# Patient Record
Sex: MALE | Race: WHITE | NOT HISPANIC OR LATINO | Employment: PART TIME | URBAN - NONMETROPOLITAN AREA
[De-identification: names, ages, dates, MRNs, and addresses within clinical notes are randomized per-mention and may not be internally consistent; named-entity substitution may affect disease eponyms.]

---
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
## 2025-03-20 ENCOUNTER — OFFICE VISIT (OUTPATIENT)
Dept: DERMATOLOGY | Facility: CLINIC | Age: 20
End: 2025-03-20
Payer: COMMERCIAL

## 2025-03-20 DIAGNOSIS — L70.0 ACNE VULGARIS: Primary | ICD-10-CM

## 2025-03-20 LAB
ALBUMIN SERPL BCG-MCNC: 4.6 G/DL (ref 3.5–5.2)
ALP SERPL-CCNC: 85 U/L (ref 40–150)
ALT SERPL W P-5'-P-CCNC: 44 U/L (ref 0–70)
ANION GAP SERPL CALCULATED.3IONS-SCNC: 10 MMOL/L (ref 7–15)
AST SERPL W P-5'-P-CCNC: 33 U/L (ref 0–45)
BILIRUB SERPL-MCNC: 0.4 MG/DL
BUN SERPL-MCNC: 11.1 MG/DL (ref 6–20)
CALCIUM SERPL-MCNC: 9.9 MG/DL (ref 8.8–10.4)
CHLORIDE SERPL-SCNC: 103 MMOL/L (ref 98–107)
CHOLEST SERPL-MCNC: 182 MG/DL
CREAT SERPL-MCNC: 1.18 MG/DL (ref 0.67–1.17)
EGFRCR SERPLBLD CKD-EPI 2021: >90 ML/MIN/1.73M2
ERYTHROCYTE [DISTWIDTH] IN BLOOD BY AUTOMATED COUNT: 12.9 % (ref 10–15)
FASTING STATUS PATIENT QL REPORTED: NO
FASTING STATUS PATIENT QL REPORTED: NO
GLUCOSE SERPL-MCNC: 68 MG/DL (ref 70–99)
HCO3 SERPL-SCNC: 29 MMOL/L (ref 22–29)
HCT VFR BLD AUTO: 44.7 % (ref 40–53)
HDLC SERPL-MCNC: 61 MG/DL
HGB BLD-MCNC: 15.3 G/DL (ref 13.3–17.7)
LDLC SERPL CALC-MCNC: 99 MG/DL
MCH RBC QN AUTO: 30.5 PG (ref 26.5–33)
MCHC RBC AUTO-ENTMCNC: 34.2 G/DL (ref 31.5–36.5)
MCV RBC AUTO: 89 FL (ref 78–100)
NONHDLC SERPL-MCNC: 121 MG/DL
PLATELET # BLD AUTO: 283 10E3/UL (ref 150–450)
POTASSIUM SERPL-SCNC: 4.2 MMOL/L (ref 3.4–5.3)
PROT SERPL-MCNC: 7.2 G/DL (ref 6.4–8.3)
RBC # BLD AUTO: 5.01 10E6/UL (ref 4.4–5.9)
SODIUM SERPL-SCNC: 142 MMOL/L (ref 135–145)
TRIGL SERPL-MCNC: 112 MG/DL
WBC # BLD AUTO: 6.5 10E3/UL (ref 4–11)

## 2025-03-20 NOTE — PROGRESS NOTES
Win Davidson is a pleasant 19 year old year old male patient here today for acne vulgaris. He notes acne has been present since he was 12 years old. He reports was on doxycycline and adapalene in the past with minimal results in 2022. He currently is using acne otc scrub, with mild results. He does have a history of anxiety and depression but controlled with his medication. Patient has no other skin complaints today.  Remainder of the HPI, Meds, PMH, Allergies, FH, and SH was reviewed in chart.    Pertinent Hx:   Acne vulgaris   History reviewed. No pertinent past medical history.    History reviewed. No pertinent surgical history.     Family History   Problem Relation Age of Onset    Other - See Comments Mother         Psychiatric illness,Bipolar    Hyperthyroidism Mother        Social History     Socioeconomic History    Marital status: Single     Spouse name: Not on file    Number of children: Not on file    Years of education: Not on file    Highest education level: Not on file   Occupational History    Not on file   Tobacco Use    Smoking status: Passive Smoke Exposure - Never Smoker    Smokeless tobacco: Never   Vaping Use    Vaping status: Never Used    Passive vaping exposure: Yes   Substance and Sexual Activity    Alcohol use: Never    Drug use: Never    Sexual activity: Never   Other Topics Concern    Not on file   Social History Narrative    37-week 5 lb. 6 oz. Twin A, large twin and twin/twin transfusion, 37 weeks gestation.       2 half-siblings and twin brother.  Dad is gone for long periods of time due to his job.   Parents  2016, mom getting an appartment and looking for work in [employer].     Rosalind Prince  Mother  Amilcar Davidson  Father     Social Drivers of Health     Financial Resource Strain: Not on file   Food Insecurity: Not on file   Transportation Needs: Not on file   Physical Activity: Not on file   Stress: Not on file   Social Connections: Not on file   Interpersonal Safety: Not  on file   Housing Stability: Not on file       Outpatient Encounter Medications as of 3/20/2025   Medication Sig Dispense Refill    adapalene (DIFFERIN) 0.1 % external gel Apply topically At Bedtime 45 g 11    doxycycline hyclate (VIBRAMYCIN) 100 MG capsule Take 1 capsule (100 mg) by mouth daily 30 capsule 1    FLUoxetine (PROZAC) 40 MG capsule Take 1 capsule (40 mg) by mouth daily 30 capsule 1    vitamin D3 (CHOLECALCIFEROL) 50 mcg (2000 units) tablet Take 1 tablet (50 mcg) by mouth daily 100 tablet 3     No facility-administered encounter medications on file as of 3/20/2025.             O:   NAD, WDWN, Alert & Oriented, Mood & Affect wnl, Vitals stable   Here today alone   There were no vitals taken for this visit.   General appearance normal   Vitals stable   Alert, oriented and in no acute distress      1+ inflammatory papules on face, chest and back      Eyes: Conjunctivae/lids:Normal     ENT: Lips normal    MSK:Normal    Pulm: Breathing Normal    Neuro/Psych: Orientation:Alert and Orientedx3 ; Mood/Affect:normal     A/P:  1. Acne Vulgaris   Discussed with history of anxiety and depression, we request note from psychiatrist stating that mood is stable and they are ok with us starting isotretinoin. Fax number was provided for patient to have his psychiatrist fax a letter.   Standing CBC, CMP and fasting lipids  Ipledge reviewed with patient and Ipledge consent form complete  Patient place in ipledge system  iPLEDGE REMS ID: [id number]  Goal Dosage:   Return to clinic 30 days  Dry lips and mouth, minor swelling of the eyelids or lips, crusty skin, nosebleeds, GI upset, or thinning of hair may occur. If any of these effects persist or worsen, tell your doctor or pharmacist promptly.   To relieve dry mouth, suck on (sugarless) hard candy or ice chips, chew (sugarless) gum, drink water.   Remember that your doctor has prescribed this medication because he or she has judged that the benefit to you is greater than the  risk of side effects. Many people using this medication do not have serious side effects.   Contact office immediately if you have any of these unlikely but serious side effects: mental/mood changes (e.g., depression,  aggressive or violent behavior, and in rare cases, thoughts of suicide), tingling feeling in the skin, quick/severe sun sensitivity, back/joint/muscle pain, signs of infection (e.g., fever, persistent sore throat, painful swallowing, peeling skin on palms/soles.   Isotretinoin may infrequently cause disease of the pancreatitis, that may rarely be fatal. Stop taking this medication and contact office immediately if you develop: severe stomach pain severe or persistent GI upset,   Stop taking this medication and tell your doctor immediately if you develop these unlikely but very serious side effects: severe headache, vision changes, ear ringing, hearling loss, chest pain, yellowing eyes, skin, dark urine, severe diarrhea, rectal bleeding,   Seek immediate medical attention if you notice any symptoms of a serious allergic reaction.    Accutane is discussed fully with the patient. It is a very effective drug to treat acne vulgaris but has many potential significant side effects. Chief among these are teratogensis, hepatic injury, dyslipidemia and severe drying of the mucous membranes. All of these issues have been discussed in details. Monthly blood tests to monitor lipids and liver functions will be necessary. Expect painful dryness and/or fissuring around the lips, eyes, and other moist areas of the body. Balms may be protective. Contact lens may be too painful to wear temporarily while on this drug. Episodes of significant depression have been reported, including suicidal ideation and attempts in rare cases. It may also cause pseudotumor cerebri and hyperostosis. The patient will report any such changes in mood, depressive symptoms or suicidal thoughts, headaches, joint or bone pains. There is also a  possible association with inflammatory bowel disease, although this is unproven at this point.

## 2025-03-20 NOTE — PATIENT INSTRUCTIONS
Accutane/Isotretinoin: Card with fax and phone number given. We will need confirmation from your psychiatrist that it is ok to start Accutane.    Return to clinic 30 days. *Inability to follow through with future appointments as scheduled may result in Accutane prescription not being available to you as this is a government regulated medication*     The first 3 follow up appointments should be in office.     Following office visits can be scheduled as virtual.    Please complete ordered lab testing prior to or the day of your virtual visit to streamline your appointment and medication . This can be done by scheduling a lab only appointment at your preferred [facility].    Please send photo documentation of your acne areas (even if improved) in a COMS Interactive message to the provider the day before or the day of your virtual visit. It can be hard to clearly visualize the current state of your skin in a virtual visit.      For female patients: Female patients MUST use two simultaneous methods of family planning/contraception. Accutane is Category X for pregnancy, meaning it will cause fetal teratogenic malformations, and pregnancy MUST be avoided while on this drug. For that reason, the patient is instructed to never share the medication. Female patients will also need to log onto Ipledge each month to answer needed questions before the pharmacy will allow the  Accutane order to be filled and picked up.    Dry lips and mouth, dry eyes, minor swelling of the eyelids or lips, crusty skin, nosebleeds, GI upset, or thinning of hair may occur. If any of these effects persist or worsen, tell your doctor or pharmacist promptly.     To relieve dry mouth, suck on (sugarless) hard candy or ice chips, chew (sugarless) gum, drink water.     Remember that your doctor has prescribed this medication because he or she has judged that the benefit to you is greater than the risk of side effects.   Many people using this  medication do not have serious side effects.     Contact office immediately if you have any of these unlikely but serious side effects: mental/mood changes (e.g., depression,  aggressive or violent behavior, and in rare cases, thoughts of suicide), tingling feeling in the skin, quick/severe sun sensitivity, back/joint/muscle pain, signs of infection (e.g., fever, persistent) sore throat, painful swallowing, peeling skin on palms/soles.     Isotretinoin may infrequently cause disease of the pancreatitis, that may rarely be fatal.   Stop taking this medication and contact office immediately if you develop: severe stomach pain severe or persistent GI upset.    Stop taking this medication and tell your doctor immediately if you develop these unlikely but very serious side effects: severe headache, vision changes, ear ringing, hearling loss, chest pain, yellowing eyes, skin, dark urine, severe diarrhea, rectal bleeding.    Seek immediate medical attention if you notice any symptoms of a serious allergic reaction.           Proper skin care from [facility]:    -Eliminate harsh soaps as they strip the natural oils from the skin, often resulting in dry itchy skin ( i.e. Dial, Zest, [place])  -Use mild soaps such as Cetaphil or Dove Sensitive Skin in the shower. You do not need to use soap on arms, legs, and trunk every time you shower unless visibly soiled.   -Avoid hot or cold showers.  -After showering, lightly dry off and apply moisturizing within 2-3 minutes. This will help trap moisture in the skin.   -Aggressive use of a moisturizer at least 1-2 times a day to the entire body (including -Vanicream, Cetaphil, Aquaphor or Cerave) and moisturize hands after every washing.  -We recommend using moisturizers that come in a tub that needs to be scooped out, not a pump. This has more of an oil base. It will hold moisture in your skin much better than a water base moisturizer. The above recommended are non-pore  clogging.      Wear a sunscreen with at least SPF 30 on your face, ears, neck and V of the chest daily. Wear sunscreen on other areas of the body if those areas are exposed to the sun throughout the day. Sunscreens can contain physical and/or chemical blockers. Physical blockers are less likely to clog pores, these include zinc oxide and titanium dioxide. Reapply every two hour and after swimming.     Sunscreen examples: https://www.ewg.org/sunscreen/    UV radiation  UVA radiation remains constant throughout the day and throughout the year. It is a longer wavelength than UVB and therefore penetrates deeper into the skin leading to immediate and delayed tanning, photoaging, and skin cancer. 70-80% of UVA and UVB radiation occurs between the hours of 10am-2pm.  UVB radiation  UVB radiation causes the most harmful effects and is more significant during the summer months. However, snow and ice can reflect UVB radiation leading to skin damage during the winter months as well. UVB radiation is responsible for tanning, burning, inflammation, delayed erythema (pinkness), pigmentation (brown spots), and skin cancer.     I recommend self monthly full body exams and yearly full body exams with a dermatology provider. If you develop a new or changing lesion please follow up for examination. Most skin cancers are pink and scaly or pink and pearly. However, we do see blue/brown/black skin cancers.  Consider the ABCDEs of melanoma when giving yourself your monthly full body exam ( don't forget the groin, buttocks, feet, toes, etc). A-asymmetry, B-borders, C-color, D-diameter, E-elevation or evolving. If you see any of these changes please follow up in clinic. If you cannot see your back I recommend purchasing a hand held mirror to use with a larger wall mirror.       Checking for Skin Cancer  You can find cancer early by checking your skin each month. There are 3 kinds of skin cancer. They are melanoma, basal cell carcinoma, and  squamous cell carcinoma. Doing monthly skin checks is the best way to find new marks or skin changes. Follow the instructions below for checking your skin.   The ABCDEs of checking moles for melanoma   Check your moles or growths for signs of melanoma using ABCDE:   Asymmetry: the sides of the mole or growth don t match  Border: the edges are ragged, notched, or blurred  Color: the color within the mole or growth varies  Diameter: the mole or growth is larger than 6 mm (size of a pencil eraser)  Evolving: the size, shape, or color of the mole or growth is changing (evolving is not shown in the images below)    Checking for other types of skin cancer  Basal cell carcinoma or squamous cell carcinoma have symptoms such as:     A spot or mole that looks different from all other marks on your skin  Changes in how an area feels, such as itching, tenderness, or pain  Changes in the skin's surface, such as oozing, bleeding, or scaliness  A sore that does not heal  New swelling or redness beyond the border of a mole    Who s at risk?  Anyone can get skin cancer. But you are at greater risk if you have:   Fair skin, light-colored hair, or light-colored eyes  Many moles or abnormal moles on your skin  A history of sunburns from sunlight or tanning beds  A family history of skin cancer  A history of exposure to radiation or chemicals  A weakened immune system  If you have had skin cancer in the past, you are at risk for recurring skin cancer.   How to check your skin  Do your monthly skin checkups in front of a full-length mirror. Check all parts of your body, including your:   Head (ears, face, neck, and scalp)  Torso (front, back, and sides)  Arms (tops, undersides, upper, and lower armpits)  Hands (palms, backs, and fingers, including under the nails)  Buttocks and genitals  Legs (front, back, and sides)  Feet (tops, soles, toes, including under the nails, and between toes)  If you have a lot of moles, take digital photos of  them each month. Make sure to take photos both up close and from a distance. These can help you see if any moles change over time.   Most skin changes are not cancer. But if you see any changes in your skin, call your doctor right away. Only he or she can diagnose a problem. If you have skin cancer, seeing your doctor can be the first step toward getting the treatment that could save your life.   [facility] last reviewed this educational content on 4/1/2019 2000-2020 [employer], [address] [place] [address], [place] [address]. All rights reserved. This information is not intended as a substitute for professional medical care. Always follow your healthcare professional's instructions.       When should I call my doctor?  If you are worsening or not improving, please, contact us or seek urgent care as noted below.     Who should I call with questions (adults)?    [facility] [phone]  For urgent needs outside of business hours call the [facility] at [phone] and ask for the dermatology resident on call to be paged  If this is a medical emergency and you are unable to reach an ER, Call [phone]      If you need a prescription refill, please contact your pharmacy. Refills are approved or denied by our Physicians during normal business hours, Monday through Friday.  Per office policy, refills will not be granted if you have not been seen within the past year (or sooner depending on the condition).

## 2025-03-20 NOTE — LETTER
3/20/2025      Win Davidson  [address]  [facility] [address]      Dear Colleague,    Thank you for referring your patient, Win Davidson, to the [facility]. Please see a copy of my visit note below.    Win Davidson is a pleasant 19 year old year old male patient here today for acne vulgaris. He notes acne has been present since he was 12 years old. He reports was on doxycycline and adapalene in the past with minimal results in 2022. He currently is using acne otc scrub, with mild results. He does have a history of anxiety and depression but controlled with his medication. Patient has no other skin complaints today.  Remainder of the HPI, Meds, PMH, Allergies, FH, and SH was reviewed in chart.    Pertinent Hx:   Acne vulgaris   History reviewed. No pertinent past medical history.    History reviewed. No pertinent surgical history.     Family History   Problem Relation Age of Onset     Other - See Comments Mother         Psychiatric illness,Bipolar     Hyperthyroidism Mother        Social History     Socioeconomic History     Marital status: Single     Spouse name: Not on file     Number of children: Not on file     Years of education: Not on file     Highest education level: Not on file   Occupational History     Not on file   Tobacco Use     Smoking status: Passive Smoke Exposure - Never Smoker     Smokeless tobacco: Never   Vaping Use     Vaping status: Never Used     Passive vaping exposure: Yes   Substance and Sexual Activity     Alcohol use: Never     Drug use: Never     Sexual activity: Never   Other Topics Concern     Not on file   Social History Narrative    37-week 5 lb. 6 oz. Twin A, large twin and twin/twin transfusion, 37 weeks gestation.       2 half-siblings and twin brother.  Dad is gone for long periods of time due to his job.   Parents  2016, mom getting an appartment and looking for work in [employer]: turn off the lights.     Rosalind Prince  Mother  Amilcar Davidson  Father      Social Drivers of Health     Financial Resource Strain: Not on file   Food Insecurity: Not on file   Transportation Needs: Not on file   Physical Activity: Not on file   Stress: Not on file   Social Connections: Not on file   Interpersonal Safety: Not on file   Housing Stability: Not on file       Outpatient Encounter Medications as of 3/20/2025   Medication Sig Dispense Refill     adapalene (DIFFERIN) 0.1 % external gel Apply topically At Bedtime 45 g 11     doxycycline hyclate (VIBRAMYCIN) 100 MG capsule Take 1 capsule (100 mg) by mouth daily 30 capsule 1     FLUoxetine (PROZAC) 40 MG capsule Take 1 capsule (40 mg) by mouth daily 30 capsule 1     vitamin D3 (CHOLECALCIFEROL) 50 mcg (2000 units) tablet Take 1 tablet (50 mcg) by mouth daily 100 tablet 3     No facility-administered encounter medications on file as of 3/20/2025.             O:   NAD, WDWN, Alert & Oriented, Mood & Affect wnl, Vitals stable   Here today alone   There were no vitals taken for this visit.   General appearance normal   Vitals stable   Alert, oriented and in no acute distress      1+ inflammatory papules on face, chest and back      Eyes: Conjunctivae/lids:Normal     ENT: Lips normal    MSK:Normal    Pulm: Breathing Normal    Neuro/Psych: Orientation:Alert and Orientedx3 ; Mood/Affect:normal     A/P:  1. Acne Vulgaris   Discussed with history of anxiety and depression, we request note from psychiatrist stating that mood is stable and they are ok with us starting isotretinoin. Fax number was provided for patient to have his psychiatrist fax a letter.   Standing CBC, CMP and fasting lipids  Ipledge reviewed with patient and Ipledge consent form complete  Patient place in ipledge system  iPLEDGE REMS ID: [id number]  Goal Dosage:   Return to clinic 30 days  Dry lips and mouth, minor swelling of the eyelids or lips, crusty skin, nosebleeds, GI upset, or thinning of hair may occur. If any of these effects persist or worsen, tell your doctor  or pharmacist promptly.   To relieve dry mouth, suck on (sugarless) hard candy or ice chips, chew (sugarless) gum, drink water.   Remember that your doctor has prescribed this medication because he or she has judged that the benefit to you is greater than the risk of side effects. Many people using this medication do not have serious side effects.   Contact office immediately if you have any of these unlikely but serious side effects: mental/mood changes (e.g., depression,  aggressive or violent behavior, and in rare cases, thoughts of suicide), tingling feeling in the skin, quick/severe sun sensitivity, back/joint/muscle pain, signs of infection (e.g., fever, persistent sore throat, painful swallowing, peeling skin on palms/soles.   Isotretinoin may infrequently cause disease of the pancreatitis, that may rarely be fatal. Stop taking this medication and contact office immediately if you develop: severe stomach pain severe or persistent GI upset,   Stop taking this medication and tell your doctor immediately if you develop these unlikely but very serious side effects: severe headache, vision changes, ear ringing, hearling loss, chest pain, yellowing eyes, skin, dark urine, severe diarrhea, rectal bleeding,   Seek immediate medical attention if you notice any symptoms of a serious allergic reaction.    Accutane is discussed fully with the patient. It is a very effective drug to treat acne vulgaris but has many potential significant side effects. Chief among these are teratogensis, hepatic injury, dyslipidemia and severe drying of the mucous membranes. All of these issues have been discussed in details. Monthly blood tests to monitor lipids and liver functions will be necessary. Expect painful dryness and/or fissuring around the lips, eyes, and other moist areas of the body. Balms may be protective. Contact lens may be too painful to wear temporarily while on this drug. Episodes of significant depression have been  reported, including suicidal ideation and attempts in rare cases. It may also cause pseudotumor cerebri and hyperostosis. The patient will report any such changes in mood, depressive symptoms or suicidal thoughts, headaches, joint or bone pains. There is also a possible association with inflammatory bowel disease, although this is unproven at this point.       Again, thank you for allowing me to participate in the care of your patient.        Sincerely,        Bibiana Fischer PA-C    Electronically signed

## 2025-03-21 ENCOUNTER — TRANSFERRED RECORDS (OUTPATIENT)
Dept: HEALTH INFORMATION MANAGEMENT | Facility: CLINIC | Age: 20
End: 2025-03-21
Payer: COMMERCIAL

## 2025-03-21 ENCOUNTER — TELEPHONE (OUTPATIENT)
Dept: DERMATOLOGY | Facility: CLINIC | Age: 20
End: 2025-03-21
Payer: COMMERCIAL

## 2025-03-21 NOTE — TELEPHONE ENCOUNTER
Called direct line for Olena- this is [facility]    Called [facility]- pressed psych extension- left voice mail that we need a letter from her psych provider stating that patient is clear to take Accutane- we need a letter faxed- not a verbal from a nurse.        A/P:  1. Acne Vulgaris   Discussed with history of anxiety and depression, we request note from psychiatrist stating that mood is stable and they are ok with us starting isotretinoin. Fax number was provided for patient to have his psychiatrist fax a letter.     [place] fax-  F: [phone]     Thank you,    Savannah LALRN BSN  [facility]- [phone]

## 2025-03-21 NOTE — TELEPHONE ENCOUNTER
Patient notified of results, his current weight is 140 pounds (63.6 Kg)  Patient spoke w/Psych yesterday and they will be getting back to us    Yudith Bell RN

## 2025-03-21 NOTE — TELEPHONE ENCOUNTER
Olena called back- when the  tried to transfer her- she was not responding to us- could not hear us. We disconnected the call.    Thank you,    Savannah LALRN BSN  [facility]- [phone]

## 2025-03-21 NOTE — TELEPHONE ENCOUNTER
[facility] Call Center    Phone Message    May a detailed message be left on voicemail: yes     Reason for Call: Olena from [facility] calling to discuss Rx for Win. [phone] main number. Please call Olena on direct line: [phone]     Action Taken: Other: [facility]    Travel Screening: Not Applicable     Date of Service:

## 2025-03-21 NOTE — TELEPHONE ENCOUNTER
----- Message from Bibiana Fischer sent at 3/20/2025  8:42 PM CDT -----  Darryl Walden,  Your blood count, cholesterol are normal. Creatinine was slightly elevated, which we will monitor. As soon as I get note from his psychiatrist I will send in medication. Please also get current weight.

## 2025-03-26 ENCOUNTER — TELEPHONE (OUTPATIENT)
Dept: DERMATOLOGY | Facility: CLINIC | Age: 20
End: 2025-03-26
Payer: COMMERCIAL

## 2025-03-26 NOTE — TELEPHONE ENCOUNTER
Patient Contact    Attempt # 1    Was call answered?  No, [facility]   [phone]

## 2025-03-26 NOTE — TELEPHONE ENCOUNTER
Received: Yesterday  Bibiana Deleon PA-C sent to [facility]  Please let patient know first month of isotretinoin sent, received note from his psychiatrist. Please have him sign up for mychart.

## 2025-04-17 ENCOUNTER — OFFICE VISIT (OUTPATIENT)
Dept: DERMATOLOGY | Facility: CLINIC | Age: 20
End: 2025-04-17
Payer: COMMERCIAL

## 2025-04-17 DIAGNOSIS — L70.0 ACNE VULGARIS: ICD-10-CM

## 2025-04-17 DIAGNOSIS — L30.8 RETINOID DERMATITIS: Primary | ICD-10-CM

## 2025-04-17 LAB
ALBUMIN SERPL BCG-MCNC: 4.5 G/DL (ref 3.5–5.2)
ALP SERPL-CCNC: 85 U/L (ref 40–150)
ALT SERPL W P-5'-P-CCNC: 40 U/L (ref 0–70)
ANION GAP SERPL CALCULATED.3IONS-SCNC: 12 MMOL/L (ref 7–15)
AST SERPL W P-5'-P-CCNC: 32 U/L (ref 0–45)
BILIRUB SERPL-MCNC: 0.5 MG/DL
BUN SERPL-MCNC: 11.5 MG/DL (ref 6–20)
CALCIUM SERPL-MCNC: 9.9 MG/DL (ref 8.8–10.4)
CHLORIDE SERPL-SCNC: 99 MMOL/L (ref 98–107)
CHOLEST SERPL-MCNC: 164 MG/DL
CREAT SERPL-MCNC: 1.15 MG/DL (ref 0.67–1.17)
EGFRCR SERPLBLD CKD-EPI 2021: >90 ML/MIN/1.73M2
ERYTHROCYTE [DISTWIDTH] IN BLOOD BY AUTOMATED COUNT: 12.5 % (ref 10–15)
FASTING STATUS PATIENT QL REPORTED: NO
FASTING STATUS PATIENT QL REPORTED: NO
GLUCOSE SERPL-MCNC: 72 MG/DL (ref 70–99)
HCO3 SERPL-SCNC: 28 MMOL/L (ref 22–29)
HCT VFR BLD AUTO: 43.7 % (ref 40–53)
HDLC SERPL-MCNC: 48 MG/DL
HGB BLD-MCNC: 15.1 G/DL (ref 13.3–17.7)
LDLC SERPL CALC-MCNC: 90 MG/DL
MCH RBC QN AUTO: 30.5 PG (ref 26.5–33)
MCHC RBC AUTO-ENTMCNC: 34.6 G/DL (ref 31.5–36.5)
MCV RBC AUTO: 88 FL (ref 78–100)
NONHDLC SERPL-MCNC: 116 MG/DL
PLATELET # BLD AUTO: 266 10E3/UL (ref 150–450)
POTASSIUM SERPL-SCNC: 4 MMOL/L (ref 3.4–5.3)
PROT SERPL-MCNC: 7.3 G/DL (ref 6.4–8.3)
RBC # BLD AUTO: 4.95 10E6/UL (ref 4.4–5.9)
SODIUM SERPL-SCNC: 139 MMOL/L (ref 135–145)
TRIGL SERPL-MCNC: 132 MG/DL
WBC # BLD AUTO: 8.5 10E3/UL (ref 4–11)

## 2025-04-17 RX ORDER — ISOTRETINOIN 40 MG/1
40 CAPSULE ORAL
Qty: 30 CAPSULE | Refills: 0 | Status: SHIPPED | OUTPATIENT
Start: 2025-04-17

## 2025-04-17 RX ORDER — TRIAMCINOLONE ACETONIDE 0.25 MG/G
OINTMENT TOPICAL
Qty: 15 G | Refills: 1 | Status: SHIPPED | OUTPATIENT
Start: 2025-04-17

## 2025-04-17 RX ORDER — PREDNISONE 10 MG/1
TABLET ORAL
Qty: 14 TABLET | Refills: 0 | Status: SHIPPED | OUTPATIENT
Start: 2025-04-17

## 2025-04-17 NOTE — PATIENT INSTRUCTIONS
Proper skin care from [facility]:    -Eliminate harsh soaps as they strip the natural oils from the skin, often resulting in dry itchy skin ( i.e. Dial, Zest, [place] Spring)  -Use mild soaps such as Cetaphil or Dove Sensitive Skin in the shower. You do not need to use soap on arms, legs, and trunk every time you shower unless visibly soiled.   -Avoid hot or cold showers.  -After showering, lightly dry off and apply moisturizing within 2-3 minutes. This will help trap moisture in the skin.   -Aggressive use of a moisturizer at least 1-2 times a day to the entire body (including -Vanicream, Cetaphil, Aquaphor or Cerave) and moisturize hands after every washing.  -We recommend using moisturizers that come in a tub that needs to be scooped out, not a pump. This has more of an oil base. It will hold moisture in your skin much better than a water base moisturizer. The above recommended are non-pore clogging.      Wear a sunscreen with at least SPF 30 on your face, ears, neck and V of the chest daily. Wear sunscreen on other areas of the body if those areas are exposed to the sun throughout the day. Sunscreens can contain physical and/or chemical blockers. Physical blockers are less likely to clog pores, these include zinc oxide and titanium dioxide. Reapply every two hour and after swimming.     Sunscreen examples: https://www.ewg.org/sunscreen/    UV radiation  UVA radiation remains constant throughout the day and throughout the year. It is a longer wavelength than UVB and therefore penetrates deeper into the skin leading to immediate and delayed tanning, photoaging, and skin cancer. 70-80% of UVA and UVB radiation occurs between the hours of 10am-2pm.  UVB radiation  UVB radiation causes the most harmful effects and is more significant during the summer months. However, snow and ice can reflect UVB radiation leading to skin damage during the winter months as well. UVB radiation is responsible for tanning,  burning, inflammation, delayed erythema (pinkness), pigmentation (brown spots), and skin cancer.     I recommend self monthly full body exams and yearly full body exams with a dermatology provider. If you develop a new or changing lesion please follow up for examination. Most skin cancers are pink and scaly or pink and pearly. However, we do see blue/brown/black skin cancers.  Consider the ABCDEs of melanoma when giving yourself your monthly full body exam ( don't forget the groin, buttocks, feet, toes, etc). A-asymmetry, B-borders, C-color, D-diameter, E-elevation or evolving. If you see any of these changes please follow up in clinic. If you cannot see your back I recommend purchasing a hand held mirror to use with a larger wall mirror.       Checking for Skin Cancer  You can find cancer early by checking your skin each month. There are 3 kinds of skin cancer. They are melanoma, basal cell carcinoma, and squamous cell carcinoma. Doing monthly skin checks is the best way to find new marks or skin changes. Follow the instructions below for checking your skin.   The ABCDEs of checking moles for melanoma   Check your moles or growths for signs of melanoma using ABCDE:   Asymmetry: the sides of the mole or growth don t match  Border: the edges are ragged, notched, or blurred  Color: the color within the mole or growth varies  Diameter: the mole or growth is larger than 6 mm (size of a pencil eraser)  Evolving: the size, shape, or color of the mole or growth is changing (evolving is not shown in the images below)    Checking for other types of skin cancer  Basal cell carcinoma or squamous cell carcinoma have symptoms such as:     A spot or mole that looks different from all other marks on your skin  Changes in how an area feels, such as itching, tenderness, or pain  Changes in the skin's surface, such as oozing, bleeding, or scaliness  A sore that does not heal  New swelling or redness beyond the border of a  mole    Who s at risk?  Anyone can get skin cancer. But you are at greater risk if you have:   Fair skin, light-colored hair, or light-colored eyes  Many moles or abnormal moles on your skin  A history of sunburns from sunlight or tanning beds  A family history of skin cancer  A history of exposure to radiation or chemicals  A weakened immune system  If you have had skin cancer in the past, you are at risk for recurring skin cancer.   How to check your skin  Do your monthly skin checkups in front of a full-length mirror. Check all parts of your body, including your:   Head (ears, face, neck, and scalp)  Torso (front, back, and sides)  Arms (tops, undersides, upper, and lower armpits)  Hands (palms, backs, and fingers, including under the nails)  Buttocks and genitals  Legs (front, back, and sides)  Feet (tops, soles, toes, including under the nails, and between toes)  If you have a lot of moles, take digital photos of them each month. Make sure to take photos both up close and from a distance. These can help you see if any moles change over time.   Most skin changes are not cancer. But if you see any changes in your skin, call your doctor right away. Only he or she can diagnose a problem. If you have skin cancer, seeing your doctor can be the first step toward getting the treatment that could save your life.   [employer] last reviewed this educational content on 4/1/2019 2000-2020 [employer]. [address] [place] [address], [place] [address]. All rights reserved. This information is not intended as a substitute for professional medical care. Always follow your healthcare professional's instructions.       When should I call my doctor?  If you are worsening or not improving, please, contact us or seek urgent care as noted below.     Who should I call with questions (adults)?    [facility] [phone]  For urgent needs outside of business hours call the [facility] at  [phone] and ask for the dermatology resident on call to be paged  If this is a medical emergency and you are unable to reach an ER, Call [phone]      If you need a prescription refill, please contact your pharmacy. Refills are approved or denied by our Physicians during normal business hours, Monday through Friday.  Per office policy, refills will not be granted if you have not been seen within the past year (or sooner depending on the condition).

## 2025-04-17 NOTE — PROGRESS NOTES
Win Davidson is a pleasant 19 year old year old male patient here today for acne vulgaris. He notes acne has been present since he was 12 years old. He reports was on doxycycline and adapalene in the past with minimal results in 2022. He currently is using acne otc scrub, with mild results. He does have a history of anxiety and depression but controlled with his medication. He finished first months of isotretinoin. He reports dryness to lips, nose. He did have a flare on right cheek Patient has no other skin complaints today.  Remainder of the HPI, Meds, PMH, Allergies, FH, and SH was reviewed in chart.    Pertinent Hx:   Acne vulgaris   History reviewed. No pertinent past medical history.    No past surgical history on file.     Family History   Problem Relation Age of Onset    Other - See Comments Mother         Psychiatric illness,Bipolar    Hyperthyroidism Mother        Social History     Socioeconomic History    Marital status: Single     Spouse name: Not on file    Number of children: Not on file    Years of education: Not on file    Highest education level: Not on file   Occupational History    Not on file   Tobacco Use    Smoking status: Passive Smoke Exposure - Never Smoker    Smokeless tobacco: Never   Vaping Use    Vaping status: Never Used    Passive vaping exposure: Yes   Substance and Sexual Activity    Alcohol use: Never    Drug use: Never    Sexual activity: Never   Other Topics Concern    Not on file   Social History Narrative    37-week 5 lb. 6 oz. Twin A, large twin and twin/twin transfusion, 37 weeks gestation.       2 half-siblings and twin brother.  Dad is gone for long periods of time due to his job.   Parents  2016, mom getting an appartment and looking for work in [employer].     Rosalind Prince  Mother  Amilcar Davidson  Father     Social Drivers of Health     Financial Resource Strain: Not on file   Food Insecurity: Not on file   Transportation Needs: Not on file   Physical Activity:  Not on file   Stress: Not on file   Social Connections: Not on file   Interpersonal Safety: Not on file   Housing Stability: Not on file       Outpatient Encounter Medications as of 4/17/2025   Medication Sig Dispense Refill    ISOtretinoin (ACCUTANE) 40 MG capsule Take 1 capsule (40 mg) by mouth daily with food. 30 capsule 0    predniSONE (DELTASONE) 10 MG tablet Take daily for next two weeks. 14 tablet 0    triamcinolone (KENALOG) 0.025 % external ointment Apply sparingly twice daily to lips as needed. 15 g 1    FLUoxetine (PROZAC) 40 MG capsule Take 1 capsule (40 mg) by mouth daily 30 capsule 1    vitamin D3 (CHOLECALCIFEROL) 50 mcg (2000 units) tablet Take 1 tablet (50 mcg) by mouth daily 100 tablet 3    [DISCONTINUED] ISOtretinoin (ACCUTANE) 40 MG capsule Take 1 capsule (40 mg) by mouth daily with food. 30 capsule 0     No facility-administered encounter medications on file as of 4/17/2025.             O:   NAD, WDWN, Alert & Oriented, Mood & Affect wnl, Vitals stable   Here today alone   There were no vitals taken for this visit.   General appearance normal   Vitals stable   Alert, oriented and in no acute distress      1+ inflammatory papules on face, chest and back      Eyes: Conjunctivae/lids:Normal     ENT: Lips normal    MSK:Normal    Pulm: Breathing Normal    Neuro/Psych: Orientation:Alert and Orientedx3 ; Mood/Affect:normal     A/P:  1. Acne Vulgaris   Discussed with history of anxiety and depression, we request note from psychiatrist stating that mood is stable and they are ok with us starting isotretinoin. Fax number was provided for patient to have his psychiatrist fax a letter.   Continue isotretinoin 40 mg daily.   Take prednisone 10 mg daily for two weeks.   Apply triamcinolone ointment bid to lips as needed.   Standing CBC, CMP and fasting lipids  Ipledge reviewed with patient and Ipledge consent form complete  Patient place in ipledge system  iPLEDGE REMS ID: [id number]  Current total: 1200 mg    Goal Dosage: 9545 mg  Return to clinic 30 days  Dry lips and mouth, minor swelling of the eyelids or lips, crusty skin, nosebleeds, GI upset, or thinning of hair may occur. If any of these effects persist or worsen, tell your doctor or pharmacist promptly.   To relieve dry mouth, suck on (sugarless) hard candy or ice chips, chew (sugarless) gum, drink water.   Remember that your doctor has prescribed this medication because he or she has judged that the benefit to you is greater than the risk of side effects. Many people using this medication do not have serious side effects.   Contact office immediately if you have any of these unlikely but serious side effects: mental/mood changes (e.g., depression,  aggressive or violent behavior, and in rare cases, thoughts of suicide), tingling feeling in the skin, quick/severe sun sensitivity, back/joint/muscle pain, signs of infection (e.g., fever, persistent sore throat, painful swallowing, peeling skin on palms/soles.   Isotretinoin may infrequently cause disease of the pancreatitis, that may rarely be fatal. Stop taking this medication and contact office immediately if you develop: severe stomach pain severe or persistent GI upset,   Stop taking this medication and tell your doctor immediately if you develop these unlikely but very serious side effects: severe headache, vision changes, ear ringing, hearling loss, chest pain, yellowing eyes, skin, dark urine, severe diarrhea, rectal bleeding,   Seek immediate medical attention if you notice any symptoms of a serious allergic reaction.    Accutane is discussed fully with the patient. It is a very effective drug to treat acne vulgaris but has many potential significant side effects. Chief among these are teratogensis, hepatic injury, dyslipidemia and severe drying of the mucous membranes. All of these issues have been discussed in details. Monthly blood tests to monitor lipids and liver functions will be necessary. Expect  painful dryness and/or fissuring around the lips, eyes, and other moist areas of the body. Balms may be protective. Contact lens may be too painful to wear temporarily while on this drug. Episodes of significant depression have been reported, including suicidal ideation and attempts in rare cases. It may also cause pseudotumor cerebri and hyperostosis. The patient will report any such changes in mood, depressive symptoms or suicidal thoughts, headaches, joint or bone pains. There is also a possible association with inflammatory bowel disease, although this is unproven at this point.

## 2025-07-07 DIAGNOSIS — L70.0 ACNE VULGARIS: ICD-10-CM

## 2025-07-07 RX ORDER — ISOTRETINOIN 30 MG/1
30 CAPSULE ORAL 2 TIMES DAILY
Qty: 60 CAPSULE | Refills: 0 | OUTPATIENT
Start: 2025-07-07

## 2025-07-07 NOTE — TELEPHONE ENCOUNTER
Requested Prescriptions   Pending Prescriptions Disp Refills    ISOtretinoin (ABSORICA) 30 MG capsule 60 capsule 0     Sig: Take 1 capsule (30 mg) by mouth 2 times daily.       There is no refill protocol information for this order          Last office visit: 5/16/2025 ; last virtual visit: Visit date not found with prescribing provider:  Felisa Anderson   Future Office Visit:      Thank you,  Jessica Arellano  PSC/ Complex   [facility]  [address] [place] [address]  Employed by [facility]

## 2025-07-16 ENCOUNTER — VIRTUAL VISIT (OUTPATIENT)
Dept: DERMATOLOGY | Facility: CLINIC | Age: 20
End: 2025-07-16
Payer: COMMERCIAL

## 2025-07-16 DIAGNOSIS — L70.0 ACNE VULGARIS: ICD-10-CM

## 2025-07-16 PROCEDURE — 98004 SYNCH AUDIO-VIDEO EST SF 10: CPT | Performed by: PHYSICIAN ASSISTANT

## 2025-07-16 RX ORDER — ISOTRETINOIN 30 MG/1
30 CAPSULE ORAL 2 TIMES DAILY
Qty: 60 CAPSULE | Refills: 0 | Status: SHIPPED | OUTPATIENT
Start: 2025-07-16

## 2025-07-16 RX ORDER — BUPROPION HYDROCHLORIDE 150 MG/1
150 TABLET ORAL EVERY MORNING
COMMUNITY
Start: 2024-10-16

## 2025-07-16 RX ORDER — OLANZAPINE 5 MG/1
1 TABLET, FILM COATED ORAL DAILY
COMMUNITY
Start: 2025-07-08

## 2025-07-16 NOTE — LETTER
"7/16/2025      Win Davidson  [address]  [place] [address]      Dear Colleague,    Thank you for referring your patient, Win Davidson, to the [facility]. Please see a copy of my visit note below.        Win Davidson is a 20 year old male who is being evaluated via a video  visit.    The patient has been notified of following:   \"This video  visit will be conducted via a call between you and your physician/provider. We have found that certain health care needs can be provided without the need for an in-person physical exam.  This service lets us provide the care you need with a video conversation.  If a prescription is necessary we can send it directly to your pharmacy.  If lab work is needed we can place an order for that and you can then stop by our lab to have the test done at a later time.  Video  visits are billed at different rates depending on your insurance coverage.  Please reach out to your insurance provider with any questions.  If during the course of the call the physician/provider feels a video visit is not appropriate, you will not be charged for this service.\"  Patient has given verbal consent for video  visit? Yes    Encounter Date: Jul 16, 2025  Office Visit     Dermatology Problem List:  1.  Acne vulgaris, 3 months complete on isotretinoin   - continue 30 mg twice daily     ____________________________________________      CC: Acne  HPI:  Mr. Win Davidson is a(n) 20 year old male who presents today for a return visit for acne. Patient has completed 3 months of Accutane.  He has been experiencing dry lips.  Overall, the patient reports tolerating medication well.  Acne has improved mild flaring for being out medication for past month.      Patient denies vision changes, headaches, myalgias, joint aches, GI upset, chronic diarrhea, blood in the stool, rashes, mood changes, suicidal ideation. Denies blood donation, surgeries/procedures and has not shared the " medication.    The patient denies worsening of depression or thoughts of hurting himself or others.  He follows with a psychiatrist on a regular basis.    Patient is otherwise feeling well, without additional skin concerns.    Medications:  Current Outpatient Medications   Medication Sig Dispense Refill     buPROPion (WELLBUTRIN XL) 150 MG 24 hr tablet Take 150 mg by mouth every morning.       FLUoxetine (PROZAC) 40 MG capsule Take 1 capsule (40 mg) by mouth daily 30 capsule 1     ISOtretinoin (ABSORICA) 30 MG capsule Take 1 capsule (30 mg) by mouth 2 times daily. 60 capsule 0     OLANZapine (ZYPREXA) 5 MG tablet Take 1 tablet by mouth daily.       vitamin D3 (CHOLECALCIFEROL) 50 mcg (2000 units) tablet Take 1 tablet (50 mcg) by mouth daily 100 tablet 3     No current facility-administered medications for this visit.      Past Medical History:   Patient Active Problem List   Diagnosis     Adjustment disorder with depressed mood     Acne vulgaris     No past medical history on file.    ____________________________________________     Labs Reviewed:  N/A    Physical Exam:  Vitals: There were no vitals taken for this visit.  SKIN: examination of the face, chest, and back  - Bilateral cheeks, several resolving inflammatory papules, depressed scarring, postinflammatory hyperpigmentation, and few cystic nodules  - Back and chest, scattered inflammatory papules, close comedones, and postinflammatory hyperpigmentation      ___________________________________________    Assessment & Plan:   # Acne vulgaris, on isotretinoin, 3 months complete    - Overall the patient reports they are doing well on Accutane.  He has been on isotretinoin 40 mg for the past 2 months.  Lab monitoring not needed today since dose of isotretinoin was not increased at last visit.  We discussed continuing with 40 mg daily or increasing to 30 mg twice daily.  The patient is in agreement with increasing his dose of Accutane at this time.    He should  continue to use a gentle cleanser, moisturizer, and Chapstick.      Labs to be completed prior to next month's visit: CBC, CMP, and fasting lipids  Ipledge reviewed with patient and Ipledge consent form complete  Patient registered in ipledge system: yes  Prescription sent to pharmacy: yes  IGI LABORATORIESge: [id number]   Current dose: will increase to isotretinoin 30 mg twice daily  Cumulative dose: 4000 mg  Goal cumulative dose of 9,545-13,999 mg (150-220 mg/kg in this 63.6 kg patient)        Follow-up: 1 month, sooner PRN     All risks, benefits and alternatives were discussed with patient.  Patient is in agreement and understands the assessment and plan.  All questions were answered.    Felisa Anderson PA-C  [facility]    Isotretinoin (Accutane) education:  -While on Accutane: do not use other topical acne medications. Do not share medication. Do not get pregnant (including one month after stopping medication). Do not donate blood. Do not wax. -Do not get laser, peels, or aggressive facials while on Accutane of for 6 months after.   -Females must  their prescription within 7 days of having urine pregnancy test.  -Dry lips and mouth, minor swelling of the eyelids or lips, crusty skin, nosebleeds, GI upset, or thinning of hair may occur. If any of these effects persist or worsen, tell your doctor or pharmacist promptly.   -To relieve dry mouth, suck on (sugarless) hard candy or ice chips, chew (sugarless) gum, drink water.   -Remember that your doctor has prescribed this medication because he or she has judged that the benefit to you is greater than the risk of side effects. Many people using this medication do not have serious side effects.   -Contact office immediately if you have any of these unlikely but serious side effects: mental/mood changes (e.g., depression,  aggressive or violent behavior, and in rare cases, thoughts of suicide), tingling feeling in the skin, quick/severe sun  sensitivity, back/joint/muscle pain, signs of infection (e.g., fever, persistent sore throat, painful swallowing, peeling skin on palms/soles.   Isotretinoin may infrequently cause pancreatitis. Stop taking this medication and contact office immediately if you develop: severe stomach pain severe or persistent GI upset.  -Stop taking this medication and tell your doctor immediately if you develop these unlikely but very serious side effects: severe headache, vision changes, ear ringing, hearling loss, chest pain, yellowing eyes, skin, dark urine, severe diarrhea, rectal bleeding,   -Seek immediate medical attention if you notice any symptoms of a serious allergic reaction.  -Wait 6 months after stopping accutane before getting piercing, tattoos, and/or laser treatment.    -Accutane is discussed fully with the patient. It is a very effective drug to treat acne vulgaris but has many potential significant side effects. Chief among these are teratogensis, hepatic injury, dyslipidemia and severe drying of the mucous membranes. All of these issues have been discussed in details. Blood tests to monitor lipids and liver functions will be necessary. Expect painful dryness and/or fissuring around the lips, eyes, and other moist areas of the body. Balms may be protective. Contact lens may be too painful to wear temporarily while on this drug. Episodes of significant depression have been reported, including suicidal ideation and attempts in rare cases. It may also cause pseudotumor cerebri and hyperostosis. The patient will report any such changes in mood, depressive symptoms or suicidal thoughts, headaches, joint or bone pains. There is also a possible association with inflammatory bowel disease, although this is unproven at this point.         Again, thank you for allowing me to participate in the care of your patient.        Sincerely,        Bibiana Fischer PA-C    Electronically signed

## 2025-07-16 NOTE — PATIENT INSTRUCTIONS
Proper skin care from [facility]:    -Eliminate harsh soaps as they strip the natural oils from the skin, often resulting in dry itchy skin ( i.e. Dial, Zest, Guyanese Spring)  -Use mild soaps such as Cetaphil or Dove Sensitive Skin in the shower. You do not need to use soap on arms, legs, and trunk every time you shower unless visibly soiled.   -Avoid hot or cold showers.  -After showering, lightly (pat) dry off and apply moisturizing within 2-3 minutes. This will help trap moisture in the skin.   -Aggressive use of a moisturizer at least 1-2 times a day to the entire body (including -Vanicream, Cetaphil, Aquaphor or Cerave) and moisturize hands after every washing.  -We recommend using moisturizers that come in a tub that needs to be scooped out, not a pump. This has more of an oil base. It will hold moisture in your skin much better than a water base moisturizer. The above recommended are non-pore clogging.      Wear a sunscreen with at least SPF 30 on your face, ears, neck and V of the chest daily. Wear sunscreen on other areas of the body if those areas are exposed to the sun throughout the day. Sunscreens can contain physical and/or chemical blockers. Physical blockers are less likely to clog pores, these include zinc oxide and titanium dioxide. Reapply every two hour and after swimming.     Sunscreen examples: https://www.ewg.org/sunscreen/    UV radiation  UVA radiation remains constant throughout the day and throughout the year. It is a longer wavelength than UVB and therefore penetrates deeper into the skin leading to immediate and delayed tanning, photoaging, and skin cancer. 70-80% of UVA and UVB radiation occurs between the hours of 10am-2pm.  UVB radiation  UVB radiation causes the most harmful effects and is more significant during the summer months. However, snow and ice can reflect UVB radiation leading to skin damage during the winter months as well. UVB radiation is responsible for  tanning, burning, inflammation, delayed erythema (pinkness), pigmentation (brown spots), and skin cancer.     I recommend self monthly full body exams and yearly full body exams with a dermatology provider. If you develop a new or changing lesion please follow up for examination. Most skin cancers are pink and scaly or pink and pearly. However, we do see blue/brown/black skin cancers.  Consider the ABCDEs of melanoma when giving yourself your monthly full body exam ( don't forget the groin, buttocks, feet, toes, etc). A-asymmetry, B-borders, C-color, D-diameter, E-elevation or evolving. If you see any of these changes please follow up in clinic. If you cannot see your back I recommend purchasing a hand held mirror to use with a larger wall mirror.       Checking for Skin Cancer  You can find cancer early by checking your skin each month. There are 3 kinds of skin cancer. They are melanoma, basal cell carcinoma, and squamous cell carcinoma. Doing monthly skin checks is the best way to find new marks or skin changes. Follow the instructions below for checking your skin.   The ABCDEs of checking moles for melanoma   Check your moles or growths for signs of melanoma using ABCDE:   Asymmetry: the sides of the mole or growth don t match  Border: the edges are ragged, notched, or blurred  Color: the color within the mole or growth varies  Diameter: the mole or growth is larger than 6 mm (size of a pencil eraser)  Evolving: the size, shape, or color of the mole or growth is changing (evolving is not shown in the images below)    Checking for other types of skin cancer  Basal cell carcinoma or squamous cell carcinoma have symptoms such as:     A spot or mole that looks different from all other marks on your skin  Changes in how an area feels, such as itching, tenderness, or pain  Changes in the skin's surface, such as oozing, bleeding, or scaliness  A sore that does not heal  New swelling or redness beyond the border of a  mole    Who s at risk?  Anyone can get skin cancer. But you are at greater risk if you have:   Fair skin, light-colored hair, or light-colored eyes  Many moles or abnormal moles on your skin  A history of sunburns from sunlight or tanning beds  A family history of skin cancer  A history of exposure to radiation or chemicals  A weakened immune system  If you have had skin cancer in the past, you are at risk for recurring skin cancer.   How to check your skin  Do your monthly skin checkups in front of a full-length mirror. Check all parts of your body, including your:   Head (ears, face, neck, and scalp)  Torso (front, back, and sides)  Arms (tops, undersides, upper, and lower armpits)  Hands (palms, backs, and fingers, including under the nails)  Buttocks and genitals  Legs (front, back, and sides)  Feet (tops, soles, toes, including under the nails, and between toes)  If you have a lot of moles, take digital photos of them each month. Make sure to take photos both up close and from a distance. These can help you see if any moles change over time.   Most skin changes are not cancer. But if you see any changes in your skin, call your doctor right away. Only he or she can diagnose a problem. If you have skin cancer, seeing your doctor can be the first step toward getting the treatment that could save your life.   [employer] last reviewed this educational content on 4/1/2019 2000-2020 [employer]. [address] [place] [address], [place] [address]. All rights reserved. This information is not intended as a substitute for professional medical care. Always follow your healthcare professional's instructions.       When should I call my doctor?  If you are worsening or not improving, please, contact us or seek urgent care as noted below.     Who should I call with questions (adults)?    [facility] [phone]  For urgent needs outside of business hours call the [facility] at  [phone] and ask for the dermatology resident on call to be paged  If this is a medical emergency and you are unable to reach an ER, Call [phone]      If you need a prescription refill, please contact your pharmacy. Refills are approved or denied by our Physicians during normal business hours, Monday through Friday.  Per office policy, refills will not be granted if you have not been seen within the past year (or sooner depending on the condition).

## 2025-07-16 NOTE — PROGRESS NOTES
"    Win Davidson is a 20 year old male who is being evaluated via a video  visit.    The patient has been notified of following:   \"This video  visit will be conducted via a call between you and your physician/provider. We have found that certain health care needs can be provided without the need for an in-person physical exam.  This service lets us provide the care you need with a video conversation.  If a prescription is necessary we can send it directly to your pharmacy.  If lab work is needed we can place an order for that and you can then stop by our lab to have the test done at a later time.  Video  visits are billed at different rates depending on your insurance coverage.  Please reach out to your insurance provider with any questions.  If during the course of the call the physician/provider feels a video visit is not appropriate, you will not be charged for this service.\"  Patient has given verbal consent for video  visit? Yes    Encounter Date: Jul 16, 2025  Office Visit     Dermatology Problem List:  1.  Acne vulgaris, 3 months complete on isotretinoin   - continue 30 mg twice daily     ____________________________________________      CC: Acne  HPI:  Mr. Win Davidson is a(n) 20 year old male who presents today for a return visit for acne. Patient has completed 3 months of Accutane.  He has been experiencing dry lips.  Overall, the patient reports tolerating medication well.  Acne has improved mild flaring for being out medication for past month.      Patient denies vision changes, headaches, myalgias, joint aches, GI upset, chronic diarrhea, blood in the stool, rashes, mood changes, suicidal ideation. Denies blood donation, surgeries/procedures and has not shared the medication.    The patient denies worsening of depression or thoughts of hurting himself or others.  He follows with a psychiatrist on a regular basis.    Patient is otherwise feeling well, without additional skin " concerns.    Medications:  Current Outpatient Medications   Medication Sig Dispense Refill    buPROPion (WELLBUTRIN XL) 150 MG 24 hr tablet Take 150 mg by mouth every morning.      FLUoxetine (PROZAC) 40 MG capsule Take 1 capsule (40 mg) by mouth daily 30 capsule 1    ISOtretinoin (ABSORICA) 30 MG capsule Take 1 capsule (30 mg) by mouth 2 times daily. 60 capsule 0    OLANZapine (ZYPREXA) 5 MG tablet Take 1 tablet by mouth daily.      vitamin D3 (CHOLECALCIFEROL) 50 mcg (2000 units) tablet Take 1 tablet (50 mcg) by mouth daily 100 tablet 3     No current facility-administered medications for this visit.      Past Medical History:   Patient Active Problem List   Diagnosis    Adjustment disorder with depressed mood    Acne vulgaris     No past medical history on file.    ____________________________________________     Labs Reviewed:  N/A    Physical Exam:  Vitals: There were no vitals taken for this visit.  SKIN: examination of the face, chest, and back  - Bilateral cheeks, several resolving inflammatory papules, depressed scarring, postinflammatory hyperpigmentation, and few cystic nodules  - Back and chest, scattered inflammatory papules, close comedones, and postinflammatory hyperpigmentation      ___________________________________________    Assessment & Plan:   # Acne vulgaris, on isotretinoin, 3 months complete    - Overall the patient reports they are doing well on Accutane.  He has been on isotretinoin 40 mg for the past 2 months.  Lab monitoring not needed today since dose of isotretinoin was not increased at last visit.  We discussed continuing with 40 mg daily or increasing to 30 mg twice daily.  The patient is in agreement with increasing his dose of Accutane at this time.    He should continue to use a gentle cleanser, moisturizer, and Chapstick.      Labs to be completed prior to next month's visit: CBC, CMP, and fasting lipids  Ipledge reviewed with patient and Ipledge consent form complete  Patient  registered in ipledge system: yes  Prescription sent to pharmacy: yes  Ipledge: [phone]   Current dose: will increase to isotretinoin 30 mg twice daily  Cumulative dose: 4000 mg  Goal cumulative dose of 9,545-13,999 mg (150-220 mg/kg in this 63.6 kg patient)        Follow-up: 1 month, sooner PRN     All risks, benefits and alternatives were discussed with patient.  Patient is in agreement and understands the assessment and plan.  All questions were answered.    Felisa Anderson PA-C  [facility]    Isotretinoin (Accutane) education:  -While on Accutane: do not use other topical acne medications. Do not share medication. Do not get pregnant (including one month after stopping medication). Do not donate blood. Do not wax. -Do not get laser, peels, or aggressive facials while on Accutane of for 6 months after.   -Females must  their prescription within 7 days of having urine pregnancy test.  -Dry lips and mouth, minor swelling of the eyelids or lips, crusty skin, nosebleeds, GI upset, or thinning of hair may occur. If any of these effects persist or worsen, tell your doctor or pharmacist promptly.   -To relieve dry mouth, suck on (sugarless) hard candy or ice chips, chew (sugarless) gum, drink water.   -Remember that your doctor has prescribed this medication because he or she has judged that the benefit to you is greater than the risk of side effects. Many people using this medication do not have serious side effects.   -Contact office immediately if you have any of these unlikely but serious side effects: mental/mood changes (e.g., depression,  aggressive or violent behavior, and in rare cases, thoughts of suicide), tingling feeling in the skin, quick/severe sun sensitivity, back/joint/muscle pain, signs of infection (e.g., fever, persistent sore throat, painful swallowing, peeling skin on palms/soles.   Isotretinoin may infrequently cause pancreatitis. Stop taking this medication and  contact office immediately if you develop: severe stomach pain severe or persistent GI upset.  -Stop taking this medication and tell your doctor immediately if you develop these unlikely but very serious side effects: severe headache, vision changes, ear ringing, hearling loss, chest pain, yellowing eyes, skin, dark urine, severe diarrhea, rectal bleeding,   -Seek immediate medical attention if you notice any symptoms of a serious allergic reaction.  -Wait 6 months after stopping accutane before getting piercing, tattoos, and/or laser treatment.    -Accutane is discussed fully with the patient. It is a very effective drug to treat acne vulgaris but has many potential significant side effects. Chief among these are teratogensis, hepatic injury, dyslipidemia and severe drying of the mucous membranes. All of these issues have been discussed in details. Blood tests to monitor lipids and liver functions will be necessary. Expect painful dryness and/or fissuring around the lips, eyes, and other moist areas of the body. Balms may be protective. Contact lens may be too painful to wear temporarily while on this drug. Episodes of significant depression have been reported, including suicidal ideation and attempts in rare cases. It may also cause pseudotumor cerebri and hyperostosis. The patient will report any such changes in mood, depressive symptoms or suicidal thoughts, headaches, joint or bone pains. There is also a possible association with inflammatory bowel disease, although this is unproven at this point.     Time spent on visit 5 minutes, used [employer] for video visit.   Patient at home.   Provider in office.

## 2025-07-22 ENCOUNTER — OFFICE VISIT (OUTPATIENT)
Dept: FAMILY MEDICINE | Facility: CLINIC | Age: 20
End: 2025-07-22
Payer: COMMERCIAL

## 2025-07-22 VITALS
DIASTOLIC BLOOD PRESSURE: 77 MMHG | RESPIRATION RATE: 16 BRPM | WEIGHT: 173 LBS | SYSTOLIC BLOOD PRESSURE: 134 MMHG | HEIGHT: 71 IN | TEMPERATURE: 97.6 F | BODY MASS INDEX: 24.22 KG/M2 | HEART RATE: 86 BPM | OXYGEN SATURATION: 98 %

## 2025-07-22 DIAGNOSIS — L70.0 ACNE VULGARIS: ICD-10-CM

## 2025-07-22 DIAGNOSIS — F43.21 ADJUSTMENT DISORDER WITH DEPRESSED MOOD: ICD-10-CM

## 2025-07-22 DIAGNOSIS — Z00.00 ROUTINE GENERAL MEDICAL EXAMINATION AT A HEALTH CARE FACILITY: Primary | ICD-10-CM

## 2025-07-22 PROCEDURE — 99385 PREV VISIT NEW AGE 18-39: CPT | Mod: 25 | Performed by: INTERNAL MEDICINE

## 2025-07-22 PROCEDURE — 3078F DIAST BP <80 MM HG: CPT | Performed by: INTERNAL MEDICINE

## 2025-07-22 PROCEDURE — 3075F SYST BP GE 130 - 139MM HG: CPT | Performed by: INTERNAL MEDICINE

## 2025-07-22 PROCEDURE — 90471 IMMUNIZATION ADMIN: CPT | Performed by: INTERNAL MEDICINE

## 2025-07-22 PROCEDURE — 90651 9VHPV VACCINE 2/3 DOSE IM: CPT | Performed by: INTERNAL MEDICINE

## 2025-07-22 PROCEDURE — 1126F AMNT PAIN NOTED NONE PRSNT: CPT | Performed by: INTERNAL MEDICINE

## 2025-07-22 SDOH — HEALTH STABILITY: PHYSICAL HEALTH: ON AVERAGE, HOW MANY DAYS PER WEEK DO YOU ENGAGE IN MODERATE TO STRENUOUS EXERCISE (LIKE A BRISK WALK)?: 3 DAYS

## 2025-07-22 SDOH — HEALTH STABILITY: PHYSICAL HEALTH: ON AVERAGE, HOW MANY MINUTES DO YOU ENGAGE IN EXERCISE AT THIS LEVEL?: 30 MIN

## 2025-07-22 ASSESSMENT — SOCIAL DETERMINANTS OF HEALTH (SDOH): HOW OFTEN DO YOU GET TOGETHER WITH FRIENDS OR RELATIVES?: NEVER

## 2025-07-22 ASSESSMENT — PAIN SCALES - GENERAL: PAINLEVEL_OUTOF10: NO PAIN (0)

## 2025-07-22 NOTE — PATIENT INSTRUCTIONS
Patient Education   Preventive Care Advice   This is general advice given by our system to help you stay healthy. However, your care team may have specific advice just for you. Please talk to your care team about your preventive care needs.  Nutrition  Eat 5 or more servings of fruits and vegetables each day.  Try wheat bread, brown rice and whole grain pasta (instead of white bread, rice, and pasta).  Get enough calcium and vitamin D. Check the label on foods and aim for 100% of the RDA (recommended daily allowance).  Lifestyle  Exercise at least 150 minutes each week  (30 minutes a day, 5 days a week).  Do muscle strengthening activities 2 days a week. These help control your weight and prevent disease.  No smoking.  Wear sunscreen to prevent skin cancer.  Have a dental exam and cleaning every 6 months.  Yearly exams  See your health care team every year to talk about:  Any changes in your health.  Any medicines your care team has prescribed.  Preventive care, family planning, and ways to prevent chronic diseases.  Shots (vaccines)   HPV shots (up to age 26), if you've never had them before.  Hepatitis B shots (up to age 59), if you've never had them before.  COVID-19 shot: Get this shot when it's due.  Flu shot: Get a flu shot every year.  Tetanus shot: Get a tetanus shot every 10 years.  Pneumococcal, hepatitis A, and RSV shots: Ask your care team if you need these based on your risk.  Shingles shot (for age 50 and up)  General health tests  Diabetes screening:  Starting at age 35, Get screened for diabetes at least every 3 years.  If you are younger than age 35, ask your care team if you should be screened for diabetes.  Cholesterol test: At age 39, start having a cholesterol test every 5 years, or more often if advised.  Bone density scan (DEXA): At age 50, ask your care team if you should have this scan for osteoporosis (brittle bones).  Hepatitis C: Get tested at least once in your life.  STIs (sexually  transmitted infections)  Before age 24: Ask your care team if you should be screened for STIs.  After age 24: Get screened for STIs if you're at risk. You are at risk for STIs (including HIV) if:  You are sexually active with more than one person.  You don't use condoms every time.  You or a partner was diagnosed with a sexually transmitted infection.  If you are at risk for HIV, ask about PrEP medicine to prevent HIV.  Get tested for HIV at least once in your life, whether you are at risk for HIV or not.  Cancer screening tests  Cervical cancer screening: If you have a cervix, begin getting regular cervical cancer screening tests starting at age 21.  Breast cancer scan (mammogram): If you've ever had breasts, begin having regular mammograms starting at age 40. This is a scan to check for breast cancer.  Colon cancer screening: It is important to start screening for colon cancer at age 45.  Have a colonoscopy test every 10 years (or more often if you're at risk) Or, ask your provider about stool tests like a FIT test every year or Cologuard test every 3 years.  To learn more about your testing options, visit:   .  For help making a decision, visit:   https://bit.ly/mt60568.  Prostate cancer screening test: If you have a prostate, ask your care team if a prostate cancer screening test (PSA) at age 55 is right for you.  Lung cancer screening: If you are a current or former smoker ages 50 to 80, ask your care team if ongoing lung cancer screenings are right for you.  For informational purposes only. Not to replace the advice of your health care provider. Copyright   2023 [facility]. All rights reserved. Clinically reviewed by the [facility] Transitions Program. [employer] 480100 - REV 01/24.  Relationships for Good Health  Relationships are important for our health and happiness. Social isolation, loneliness and lack of support are bad for your health. Studies show that loneliness can harm health  and limit your life span as much as high blood pressure and smoking.   Take some time to reflect on your relationships. Then answer these questions:  Are there people in your life that cause you stress or drain your energy? What can you do to set limits?  ________________________________________________________________________________________________________________________________________________________________________________________________________________________________________________________________________________________________________________________________________________  Who do you enjoy spending time with? Who can you go to for support?  ________________________________________________________________________________________________________________________________________________________________________________________________________________________________________________________________________________________________________________________________________________  What can you do to improve your relationships with others?  __________________________________________________________________________________________________________________________________________________________________________________________________________________  ______________________________________________________________________________________________________________________________  What do you like most about your relationships with others?  ________________________________________________________________________________________________________________________________________________________________________________________________________________________________________________________________________________________________________________________________________________  My goal: ______________________________________________________________________  I will:  ______________________________________________________________________________________________________________________________________________________________________________________________    For informational purposes only. Not to replace the advice of your health care provider. Copyright   2018 [facility]. All rights reserved. Clinically reviewed by [facility]  Team. [employer] 303233 - Rev 06/24.  Learning About Stress  What is stress?     Stress is your body's response to a hard situation. Your body can have a physical, emotional, or mental response. Stress is a fact of life for most people, and it affects everyone differently. What causes stress for you may not be stressful for someone else.  A lot of things can cause stress. You may feel stress when you go on a job interview, take a test, or run a race. This kind of short-term stress is normal and even useful. It can help you if you need to work hard or react quickly. For example, stress can help you finish an important job on time.  Long-term stress is caused by ongoing stressful situations or events. Examples of long-term stress include long-term health problems, ongoing problems at work, or conflicts in your family. Long-term stress can harm your health.  How does stress affect your health?  When you are stressed, your body responds as though you are in danger. It makes hormones that speed up your heart, make you breathe faster, and give you a burst of energy. This is called the fight-or-flight stress response. If the stress is over quickly, your body goes back to normal and no harm is done.  But if stress happens too often or lasts too long, it can have bad effects. Long-term stress can make you more likely to get sick, and it can make symptoms of some diseases worse. If you tense up when you are stressed, you may develop neck, shoulder, or low back pain. Stress is linked to high blood pressure and heart disease.  Stress also  harms your emotional health. It can make you chau, tense, or depressed. Your relationships may suffer, and you may not do well at work or school.  What can you do to manage stress?  You can try these things to help manage stress:   Do something active. Exercise or activity can help reduce stress. Walking is a great way to get started. Even everyday activities such as housecleaning or yard work can help.  Try yoga or hung chi. These techniques combine exercise and meditation. You may need some training at first to learn them.  Do something you enjoy. For example, listen to music or go to a movie. Practice your hobby or do volunteer work.  Meditate. This can help you relax, because you are not worrying about what happened before or what may happen in the future.  Do guided imagery. Imagine yourself in any setting that helps you feel calm. You can use online videos, books, or a teacher to guide you.  Do breathing exercises. For example:  From a standing position, bend forward from the waist with your knees slightly bent. Let your arms dangle close to the floor.  Breathe in slowly and deeply as you return to a standing position. Roll up slowly and lift your head last.  Hold your breath for just a few seconds in the standing position.  Breathe out slowly and bend forward from the waist.  Let your feelings out. Talk, laugh, cry, and express anger when you need to. Talking with supportive friends or family, a counselor, or a nora leader about your feelings is a healthy way to relieve stress. Avoid discussing your feelings with people who make you feel worse.  Write. It may help to write about things that are bothering you. This helps you find out how much stress you feel and what is causing it. When you know this, you can find better ways to cope.  What can you do to prevent stress?  You might try some of these things to help prevent stress:  Manage your time. This helps you find time to do the things you want and need to  "do.  Get enough sleep. Your body recovers from the stresses of the day while you are sleeping.  Get support. Your family, friends, and community can make a difference in how you experience stress.  Limit your news feed. Avoid or limit time on social media or news that may make you feel stressed.  Do something active. Exercise or activity can help reduce stress. Walking is a great way to get started.  Where can you learn more?  Go to https://www.[employer].net/patiented  Enter N032 in the search box to learn more about \"Learning About Stress.\"  Current as of: October 24, 2024  Content Version: 14.5    6292-8772 [employer].   Care instructions adapted under license by your healthcare professional. If you have questions about a medical condition or this instruction, always ask your healthcare professional. [employer] disclaims any warranty or liability for your use of this information.       "

## 2025-07-22 NOTE — PROGRESS NOTES
Preventive Care Visit  [facility]  Aren Padgett MD, Internal Medicine  Jul 22, 2025      Assessment & Plan     (Z00.00) Routine general medical examination at a health care facility  (primary encounter diagnosis)  Comment: Preventative physical.  No specific concerns.  He is due for HPV vaccine.  We discussed indication and complications of HPV vaccine  Plan: Will go ahead and give the first HPV vaccine.    (L70.0) Acne vulgaris  Comment: He has a history of acne vulgaris.  He is on isotretinoin  Plan: Continue current meds    (F43.21) Adjustment disorder with depressed mood  Comment: He has a history of depression.  He is doing quite well on his medications.  Plan: Follow-up with psychiatry      Counseling  Appropriate preventive services were addressed with this patient via screening, questionnaire, or discussion as appropriate for fall prevention, nutrition, physical activity, Tobacco-use cessation, social engagement, weight loss and cognition.  Checklist reviewing preventive services available has been given to the patient.  Reviewed patient's diet, addressing concerns and/or questions.   He is at risk for lack of exercise and has been provided with information to increase physical activity for the benefit of his well-being.   Patient is at risk for social isolation and has been provided with information about the benefit of social connection.   The patient was instructed to see the dentist every 6 months.   He is at risk for psychosocial distress and has been provided with information to reduce risk.           Karlo Walden is a 20 year old, presenting for the following:  Physical        7/22/2025     2:21 PM   Additional Questions   Roomed by Anuja MONTGOMERY   Accompanied by Self         7/22/2025   Forms   Any forms needing to be completed Yes          HPI  20-year-old male presents for preventative physical.  He has a history of adjustment disorder with depressed mood.  He has a  history of acne vulgaris.  His medications were reviewed.  He is on vitamin D supplements during the winter.  He did not take it during the summer he works at a [employer].  He is not in school.    Advance Care Planning            7/22/2025   General Health   How would you rate your overall physical health? (!) FAIR   Feel stress (tense, anxious, or unable to sleep) To some extent   (!) STRESS CONCERN      7/22/2025   Nutrition   Three or more servings of calcium each day? Yes   Diet: Regular (no restrictions)   How many servings of fruit and vegetables per day? (!) 0-1   How many sweetened beverages each day? (!) 2         7/22/2025   Exercise   Days per week of moderate/strenous exercise 3 days   Average minutes spent exercising at this level 30 min         7/22/2025   Social Factors   Frequency of gathering with friends or relatives Never   Worry food won't last until get money to buy more No   Food not last or not have enough money for food? No   Do you have housing? (Housing is defined as stable permanent housing and does not include staying outside in a car, in a tent, in an abandoned building, in an overnight shelter, or couch-surfing.) Yes   Are you worried about losing your housing? Yes   Lack of transportation? No   Unable to get utilities (heat,electricity)? No   Want help with housing or utility concern? No   (!) HOUSING CONCERN PRESENT(!) SOCIAL CONNECTIONS CONCERN      7/22/2025   Dental   Dentist two times every year? (!) NO           Today's PHQ-2 Score:       5/16/2025     1:16 PM   PHQ-2 ( 1999 [employer])   Q1: Little interest or pleasure in doing things 1   Q2: Feeling down, depressed or hopeless 1   PHQ-2 Score 2         7/22/2025   Substance Use   Alcohol more than 3/day or more than 7/wk Not Applicable   Do you use any other substances recreationally? No     Social History     Tobacco Use    Smoking status: Passive Smoke Exposure - Never Smoker    Smokeless tobacco: Never   Vaping Use    Vaping  "status: Never Used    Passive vaping exposure: Yes   Substance Use Topics    Alcohol use: Never    Drug use: Never             7/22/2025   One time HIV Screening   Previous HIV test? No         7/22/2025   STI Screening   New sexual partner(s) since last STI/HIV test? No         7/22/2025   Contraception/Family Planning   Questions about contraception or family planning No        Reviewed and updated as needed this visit by Provider                    History reviewed. No pertinent past medical history.  History reviewed. No pertinent surgical history.  Current Outpatient Medications   Medication Sig Dispense Refill    buPROPion (WELLBUTRIN XL) 150 MG 24 hr tablet Take 150 mg by mouth every morning.      FLUoxetine (PROZAC) 20 MG capsule take 1 capsule by oral route 1 time per day with 40 mg capsules for total of 60 mg a day.      FLUoxetine (PROZAC) 40 MG capsule Take 1 capsule (40 mg) by mouth daily 30 capsule 1    ISOtretinoin (ABSORICA) 30 MG capsule Take 1 capsule (30 mg) by mouth 2 times daily. 60 capsule 0    OLANZapine (ZYPREXA) 5 MG tablet Take 1 tablet by mouth daily.      vitamin D3 (CHOLECALCIFEROL) 50 mcg (2000 units) tablet Take 1 tablet (50 mcg) by mouth daily 100 tablet 3     No Known Allergies      Review of Systems  Constitutional, HEENT, cardiovascular, pulmonary, GI, , musculoskeletal, neuro, skin, endocrine and psych systems are negative, except as otherwise noted.     Objective    Exam  There were no vitals taken for this visit.   Estimated body mass index is 18.35 kg/m  as calculated from the following:    Height as of 4/15/22: 1.803 m (5' 11\").    Weight as of 4/15/22: 59.7 kg (131 lb 9.6 oz).    Physical Exam  GENERAL: alert and no distress  EYES: Eyes grossly normal to inspection, PERRL and conjunctivae and sclerae normal  HENT: ear canals and TM's normal, nose and mouth without ulcers or lesions  NECK: no adenopathy, no asymmetry, masses, or scars  RESP: lungs clear to auscultation - no " rales, rhonchi or wheezes  CV: regular rate and rhythm, normal S1 S2, no S3 or S4, no murmur, click or rub, no peripheral edema  ABDOMEN: soft, nontender, no hepatosplenomegaly, no masses and bowel sounds normal  MS: no gross musculoskeletal defects noted, no edema  SKIN: no suspicious lesions or rashes  NEURO: Normal strength and tone, mentation intact and speech normal  PSYCH: mentation appears normal, affect normal/bright        Signed Electronically by: Aren Padgett MD